# Patient Record
Sex: MALE | Race: WHITE | NOT HISPANIC OR LATINO | Employment: STUDENT | URBAN - METROPOLITAN AREA
[De-identification: names, ages, dates, MRNs, and addresses within clinical notes are randomized per-mention and may not be internally consistent; named-entity substitution may affect disease eponyms.]

---
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
## 2023-05-24 ENCOUNTER — OFFICE VISIT (OUTPATIENT)
Dept: OBGYN CLINIC | Facility: CLINIC | Age: 18
End: 2023-05-24

## 2023-05-24 VITALS
SYSTOLIC BLOOD PRESSURE: 120 MMHG | WEIGHT: 220 LBS | DIASTOLIC BLOOD PRESSURE: 60 MMHG | HEIGHT: 71 IN | BODY MASS INDEX: 30.8 KG/M2

## 2023-05-24 DIAGNOSIS — M79.645 FINGER PAIN, LEFT: ICD-10-CM

## 2023-05-24 NOTE — PROGRESS NOTES
HPI:  Pt is a 20 yo male with a left ring finger FDS muscle strain  He notes improvement with conservative care  He has started some light climbing activities of late without significant discomfort  He notes some bilateral middle finger discomfort at times  PE:  LUE:  Able to make a full fist complex, no edema, full finger extension, normal IP flexion, SILT, no edema  BUE:  Palpable flexor tendon nodules at middle finger palmar digital crease level, some tenderness, no locking    A/P:  Pt is a 20 yo male with a left ring finger FDS muscle strain   -may continue to increase activity as tolerated  -Monitor his bilateral middle trigger finger symptoms as they are mild presently  -May f/u PRN

## 2023-07-27 NOTE — PROGRESS NOTES
PT Evaluation     Today's date: 2023  Patient name: Mila Benitez  : 2005  MRN: [id number]  Referring provider: Julito Carpio DO  Dx:   Encounter Diagnosis     ICD-10-CM    1. Cervicalgia  M54.2       2. Chronic right shoulder pain  M25.511     G89.29                      Assessment  Assessment details: Mila Benitez is a pleasant 19 y.o. presenting to physical therapy with MD referral for cervical and right shoulder pain. Pt's shoulder symptoms are presenting as more radicular symptoms, rather than separate shoulder issue at this time. Pt's shoulder symptoms were reduced with manual cervical traction as well as seated cervical extension. Problem list: Poor posture, decreased upper extremity strength, limited cervical and thoracic joint mobility, increased muscle tone and reduced upper extremity strength. Treatment to include: Manual therapy techniques, cervical and thoracic ROM, shoulder/periscapular muscle strengthening, mechanical traction, instruction in a comprehensive HEP, modalities as needed. This pt would benefit from skilled PT services to address their impairments and functional limitations to maximize functional outcome. Barriers to therapy: Chronicity of symptoms  Understanding of Dx/Px/POC: good   Prognosis: good    Goals  ST. Pt will improve shoulder flexion strength to at least 4+/5 in 3 weeks. 2. Pt will improve shoulder abduction strength to at least 4+/5 in 3 weeks. LT. Pt will be able to kayak and white water raft with minimal to no pain in 6 weeks. 2. Pt will be independent in a comprehensive HEP in 6 weeks. 3. Pt will be able to sit to perform schoolwork with minimal to no pain in 6 weeks.     Plan  Patient would benefit from: skilled physical therapy  Frequency: 2x week  Duration in weeks: 6  Plan of Care beginning date: 2023  Plan of Care expiration date: 2023  Treatment plan discussed with: patient        Subjective Evaluation    History of Present Illness  Mechanism of injury: Pt reports in 2022 when he started at [facility]. Pt states he developed neck discomfort which he attributed to bad beds. Pt states his symptoms are located in right UT/LS region. Pt typically sleeps on his sides; however, has been avoiding his right side due to pain. Pt states about 1-2 months afterwards, he began to experience right shoulder discomfort with no known reason which is located between his spine and scapula. Pt states with kayaking and white water rafting, he experiences pain; however, he does not have issues with climbing. Pt denies any numbness/tingling or weakness. Pt denies any dizziness/lightheadedness. Pt does report frequent headaches which is typically resolved with ibuprofen. Pt denies any changes with coughing,laughing, sneezing. Premorbid status:  - ADLs: Independent with no difficulty  - Work: Full time, Full duty- [employer] white water rafting in the summers  - Recreation: kayaking, white water rafting, mountain climbing    Current status:  - ADLs/Functional activities:     - Rotating neck while driving with no pain   - Looking up to wash hair with no pain   - Looking down to read a book with mild pain   - Reaching into overhead cabinets with Pain Levels: no pain   - Lifting kayak up onto right shoulder with moderate pain in right shoulder/periscapular area   - Sleeping with 0 nightly sleep disturbances due to pain   - Sitting > 60 minutes with increased pain in shoulder and neck  - Work: Part time, Full duty-[employer] in the summers  - Recreation: kayaking, white water rafting, mountain climbing  Patient Goals  Patient goals for therapy: decreased pain and return to sport/leisure activities  Patient goal: Be prepared for return to school .   Pain  Current pain ratin  At worst pain ratin  Location: medial border of right scapula and right UT/LS  Quality: tight and sharp  Alleviating factors: gentle motions. Aggravating factors: lifting (prolonged sitting,)  Progression: worsening    Hand dominance: right    Treatments  Treatments tried: self stretches/improving posture.   Current treatment: physical therapy        Objective     Postural Observations  Seated posture: fair  Standing posture: fair    Additional Postural Observation Details  Moderate forward rounded shoulders and forward ead posture    Palpation     Additional Palpation Details  Increased TTP over R UT/LS; however, both demonstrate increased tension equally bilaterally    Neurological Testing     Sensation   Cervical/Thoracic   Left   Intact: light touch    Right   Intact: light touch    Shoulder   Left Shoulder   Intact: light touch    Right Shoulder   Intact: light touch    Reflexes   Left   Biceps (C5/C6): normal (2+)  Brachioradialis (C6): normal (2+)  Triceps (C7): normal (2+)    Right   Biceps (C5/C6): normal (2+)  Brachioradialis (C6): normal (2+)  Triceps (C7): normal (2+)    Active Range of Motion   Cervical/Thoracic Spine       Cervical    Flexion: 65 (stretch sensation in cervical and shoulder) degrees  with pain  Extension: 60 degrees      Left lateral flexion: 39 (tension in right cervical and periscapular region) degrees      Right lateral flexion: 38 degrees      Left rotation: 80 (tension in right cervical and periscapular region) degrees  Right rotation: 89 degrees         Thoracic    Flexion:  Restriction level: minimal  Extension:  Restriction level: minimal  Left rotation:  Restriction level: minimal  Right rotation:  Restriction level: minimal  Left Shoulder   Normal active range of motion    Right Shoulder   Normal active range of motion    Joint Play     Hypomobile: C2, C3, C4, C5, C6, C7, T1, T2, T3, T4, T5, T6, T7, T8 and T9     Pain: C2, C3, T2, T3 and T4   Mechanical Assessment    Cervical    Seated Protrusion: repeated movements   Pain location: no change  Seated retraction: repeated movements   Pain location: no change  Pain intensity: worse  Seated Flexion:  repeated movements  Pain location: peripheralized  Pain intensity: worse  Pain level: increased  Seated Extension: repeated movements  Pain location: centralized  Pain intensity: better  Pain level: decreased    Thoracic      Lumbar      Strength/Myotome Testing   Cervical Spine     Left   Interossei strength (t1): 5    Right   Interossei strength (t1): 5    Left Shoulder     Planes of Motion   Flexion: 4   Abduction: 4   External rotation at 0°: 4+   Internal rotation at 0°: 5     Right Shoulder     Planes of Motion   Flexion: 4+   Abduction: 5   External rotation at 0°: 5   Internal rotation at 0°: 5     Left Elbow   Flexion: 5  Extension: 4+    Right Elbow   Flexion: 5  Extension: 4+    Left Wrist/Hand   Wrist extension: 5  Wrist flexion: 5  Thumb extension: 4    Right Wrist/Hand   Wrist extension: 5  Wrist flexion: 5  Thumb extension: 5    Tests   Cervical   Positive repeated flexion. Negative vertical compression, alar ligament test, transverse ligament test and VBI. Left   Negative Spurling's Test A. Right   Negative Spurling's Test A. Left Shoulder   Negative Neer's. Right Shoulder   Positive Hawkin's. Negative drop arm, full can and Neer's. Lumbar   Negative vertical compression. Additional Tests Details  Reduced radicular symptoms with manual cervical traction in both neutral and flexion.              Precautions: none  HEP: Access Code: [email or web address] (updated: 7-28-23)    POC Expires: 9-8-23  Next RE Due: 8-25-23    Manuals 7-28 (IE)            Cervical joint mobilizations NV            Central PA thoracic mobilizatoins NV                                      Neuro Re-Ed             TB:             - rows 20 x 5" GTB NV            - pulldowns 20 x 5" GTB NV            - no money 20 x 5" GTB NV                         Prone (towel under forehead):             - I's             - T's             - Y's             - W's Ther Ex             UBE (ant/post) to improve UE strength/endurance 4'/4' 120 rpm NV            Thoracic extension over towel roll on UBE chair 20 x 5" NV                         Supine on half foam:             - pec stretch 60" x 2 NV            - horizontal abd/add 60" x 2 NV            - shoulder flex/ext 60" x 2 NV                                      Ther Activity                                       Gait Training                                       Modalities             Cervical traction, static, 30% speed, 6 steps 10 mins, 10# NV                           * On initial evaluation, educated pt on anatomy, pathology, and exercise rationale. Provided pt with basic HEP and ensured proper exercise performance. Educated pt to call with any questions or concerns.

## 2023-07-28 ENCOUNTER — EVALUATION (OUTPATIENT)
Dept: PHYSICAL THERAPY | Facility: CLINIC | Age: 18
End: 2023-07-28
Payer: COMMERCIAL

## 2023-07-28 DIAGNOSIS — G89.29 CHRONIC RIGHT SHOULDER PAIN: ICD-10-CM

## 2023-07-28 DIAGNOSIS — M54.2 CERVICALGIA: Primary | ICD-10-CM

## 2023-07-28 DIAGNOSIS — M25.511 CHRONIC RIGHT SHOULDER PAIN: ICD-10-CM

## 2023-07-28 PROCEDURE — 97110 THERAPEUTIC EXERCISES: CPT | Performed by: PHYSICAL THERAPIST

## 2023-07-28 PROCEDURE — 97162 PT EVAL MOD COMPLEX 30 MIN: CPT | Performed by: PHYSICAL THERAPIST

## 2023-07-28 NOTE — LETTER
2023    [address] [facility] [address]    Patient: Andrea Stokes   YOB: 2005   Date of Visit: 2023     Encounter Diagnosis     ICD-10-CM    1. Cervicalgia  M54.2       2. Chronic right shoulder pain  M25.511     G89.29           Dear Dr. Vipul Hairston:    Thank you for your recent referral of Andrea Stokes. Please review the attached evaluation summary from Cameron's recent visit. Please verify that you agree with the plan of care by signing the attached order. If you have any questions or concerns, please do not hesitate to call. I sincerely appreciate the opportunity to share in the care of one of your patients and hope to have another opportunity to work with you in the near future. Sincerely,    Jonatan Murillo, PT      Referring Provider:      I certify that I have read the below Plan of Care and certify the need for these services furnished under this plan of treatment while under my care. Ilene Newman DO   [address] [place] [address]  Via Fax: [phone]          PT Evaluation     Today's date: 2023  Patient name: Andrea Stokes  : 2005  MRN: [id number]  Referring provider: Ilene Newman DO  Dx:   Encounter Diagnosis     ICD-10-CM    1. Cervicalgia  M54.2       2. Chronic right shoulder pain  M25.511     G89.29                      Assessment  Assessment details: Andrea Stokes is a pleasant 19 y.o. presenting to physical therapy with MD referral for cervical and right shoulder pain. Pt's shoulder symptoms are presenting as more radicular symptoms, rather than separate shoulder issue at this time. Pt's shoulder symptoms were reduced with manual cervical traction as well as seated cervical extension.      Problem list: Poor posture, decreased upper extremity strength, limited cervical and thoracic joint mobility, increased muscle tone and reduced upper extremity strength. Treatment to include: Manual therapy techniques, cervical and thoracic ROM, shoulder/periscapular muscle strengthening, mechanical traction, instruction in a comprehensive HEP, modalities as needed. This pt would benefit from skilled PT services to address their impairments and functional limitations to maximize functional outcome. Barriers to therapy: Chronicity of symptoms  Understanding of Dx/Px/POC: good   Prognosis: good    Goals  ST. Pt will improve shoulder flexion strength to at least 4+/5 in 3 weeks. 2. Pt will improve shoulder abduction strength to at least 4+/5 in 3 weeks. LT. Pt will be able to kayak and white water raft with minimal to no pain in 6 weeks. 2. Pt will be independent in a comprehensive HEP in 6 weeks. 3. Pt will be able to sit to perform schoolwork with minimal to no pain in 6 weeks. Plan  Patient would benefit from: skilled physical therapy  Frequency: 2x week  Duration in weeks: 6  Plan of Care beginning date: 2023  Plan of Care expiration date: 2023  Treatment plan discussed with: patient        Subjective Evaluation    History of Present Illness  Mechanism of injury: Pt reports in 2022 when he started at [facility]. Pt states he developed neck discomfort which he attributed to bad beds. Pt states his symptoms are located in right UT/LS region. Pt typically sleeps on his sides; however, has been avoiding his right side due to pain. Pt states about 1-2 months afterwards, he began to experience right shoulder discomfort with no known reason which is located between his spine and scapula. Pt states with kayaking and white water rafting, he experiences pain; however, he does not have issues with climbing. Pt denies any numbness/tingling or weakness. Pt denies any dizziness/lightheadedness. Pt does report frequent headaches which is typically resolved with ibuprofen.  Pt denies any changes with coughing,laughing, sneezing. Premorbid status:  - ADLs: Independent with no difficulty  - Work: Full time, Full duty- [employer] in the summers  - Recreation: kayaking, white water rafting, mountain climbing    Current status:  - ADLs/Functional activities:     - Rotating neck while driving with no pain   - Looking up to wash hair with no pain   - Looking down to read a book with mild pain   - Reaching into overhead cabinets with Pain Levels: no pain   - Lifting kayak up onto right shoulder with moderate pain in right shoulder/periscapular area   - Sleeping with 0 nightly sleep disturbances due to pain   - Sitting > 60 minutes with increased pain in shoulder and neck  - Work: Part time, Full duty-[employer] in the summers  - Recreation: kayaking, white water rafting, mountain climbing  Patient Goals  Patient goals for therapy: decreased pain and return to sport/leisure activities  Patient goal: Be prepared for return to school . Pain  Current pain ratin  At worst pain ratin  Location: medial border of right scapula and right UT/LS  Quality: tight and sharp  Alleviating factors: gentle motions. Aggravating factors: lifting (prolonged sitting,)  Progression: worsening    Hand dominance: right    Treatments  Treatments tried: self stretches/improving posture.   Current treatment: physical therapy        Objective     Postural Observations  Seated posture: fair  Standing posture: fair    Additional Postural Observation Details  Moderate forward rounded shoulders and forward ead posture    Palpation     Additional Palpation Details  Increased TTP over R UT/LS; however, both demonstrate increased tension equally bilaterally    Neurological Testing     Sensation   Cervical/Thoracic   Left   Intact: light touch    Right   Intact: light touch    Shoulder   Left Shoulder   Intact: light touch    Right Shoulder   Intact: light touch    Reflexes   Left   Biceps (C5/C6): normal (2+)  Brachioradialis (C6): normal (2+)  Triceps (C7): normal (2+)    Right   Biceps (C5/C6): normal (2+)  Brachioradialis (C6): normal (2+)  Triceps (C7): normal (2+)    Active Range of Motion   Cervical/Thoracic Spine       Cervical    Flexion: 65 (stretch sensation in cervical and shoulder) degrees  with pain  Extension: 60 degrees      Left lateral flexion: 39 (tension in right cervical and periscapular region) degrees      Right lateral flexion: 38 degrees      Left rotation: 80 (tension in right cervical and periscapular region) degrees  Right rotation: 89 degrees         Thoracic    Flexion:  Restriction level: minimal  Extension:  Restriction level: minimal  Left rotation:  Restriction level: minimal  Right rotation:  Restriction level: minimal  Left Shoulder   Normal active range of motion    Right Shoulder   Normal active range of motion    Joint Play     Hypomobile: C2, C3, C4, C5, C6, C7, T1, T2, T3, T4, T5, T6, T7, T8 and T9     Pain: C2, C3, T2, T3 and T4   Mechanical Assessment    Cervical    Seated Protrusion: repeated movements   Pain location: no change  Seated retraction: repeated movements   Pain location: no change  Pain intensity: worse  Seated Flexion:  repeated movements  Pain location: peripheralized  Pain intensity: worse  Pain level: increased  Seated Extension: repeated movements  Pain location: centralized  Pain intensity: better  Pain level: decreased    Thoracic      Lumbar      Strength/Myotome Testing   Cervical Spine     Left   Interossei strength (t1): 5    Right   Interossei strength (t1): 5    Left Shoulder     Planes of Motion   Flexion: 4   Abduction: 4   External rotation at 0°: 4+   Internal rotation at 0°: 5     Right Shoulder     Planes of Motion   Flexion: 4+   Abduction: 5   External rotation at 0°: 5   Internal rotation at 0°: 5     Left Elbow   Flexion: 5  Extension: 4+    Right Elbow   Flexion: 5  Extension: 4+    Left Wrist/Hand   Wrist extension: 5  Wrist flexion: 5  Thumb extension: 4    Right Wrist/Hand   Wrist extension: 5  Wrist flexion: 5  Thumb extension: 5    Tests   Cervical   Positive repeated flexion. Negative vertical compression, alar ligament test, transverse ligament test and VBI. Left   Negative Spurling's Test A. Right   Negative Spurling's Test A. Left Shoulder   Negative Neer's. Right Shoulder   Positive Hawkin's. Negative drop arm, full can and Neer's. Lumbar   Negative vertical compression. Additional Tests Details  Reduced radicular symptoms with manual cervical traction in both neutral and flexion. Precautions: none  HEP: Access Code: [email or web address] (updated: 7-28-23)    POC Expires: 9-8-23  Next RE Due: 8-25-23    Manuals 7-28 (IE)            Cervical joint mobilizations NV            Central PA thoracic mobilizatoins NV                                      Neuro Re-Ed             TB:             - rows 20 x 5" GTB NV            - pulldowns 20 x 5" GTB NV            - no money 20 x 5" GTB NV                         Prone (towel under forehead):             - I's             - T's             - Y's             - W's                          Ther Ex             UBE (ant/post) to improve UE strength/endurance 4'/4' 120 rpm NV            Thoracic extension over towel roll on UBE chair 20 x 5" NV                         Supine on half foam:             - pec stretch 60" x 2 NV            - horizontal abd/add 60" x 2 NV            - shoulder flex/ext 60" x 2 NV                                      Ther Activity                                       Gait Training                                       Modalities             Cervical traction, static, 30% speed, 6 steps 10 mins, 10# NV                          * On initial evaluation, educated pt on anatomy, pathology, and exercise rationale. Provided pt with basic HEP and ensured proper exercise performance. Educated pt to call with any questions or concerns.

## 2023-07-31 ENCOUNTER — OFFICE VISIT (OUTPATIENT)
Dept: PHYSICAL THERAPY | Facility: CLINIC | Age: 18
End: 2023-07-31
Payer: COMMERCIAL

## 2023-07-31 DIAGNOSIS — M25.511 CHRONIC RIGHT SHOULDER PAIN: Primary | ICD-10-CM

## 2023-07-31 DIAGNOSIS — G89.29 CHRONIC RIGHT SHOULDER PAIN: Primary | ICD-10-CM

## 2023-07-31 DIAGNOSIS — M54.2 CERVICALGIA: ICD-10-CM

## 2023-07-31 PROCEDURE — 97112 NEUROMUSCULAR REEDUCATION: CPT | Performed by: PHYSICAL THERAPIST

## 2023-07-31 PROCEDURE — 97140 MANUAL THERAPY 1/> REGIONS: CPT | Performed by: PHYSICAL THERAPIST

## 2023-07-31 PROCEDURE — 97012 MECHANICAL TRACTION THERAPY: CPT | Performed by: PHYSICAL THERAPIST

## 2023-07-31 PROCEDURE — 97110 THERAPEUTIC EXERCISES: CPT | Performed by: PHYSICAL THERAPIST

## 2023-07-31 NOTE — PROGRESS NOTES
Daily Note     Today's date: 2023  Patient name: Irma Dumont  : 2005  MRN: [id number]  Referring provider: Jeffery Isbell DO  Dx:   Encounter Diagnosis     ICD-10-CM    1. Chronic right shoulder pain  M25.511     G89.29       2. Cervicalgia  M54.2                      Subjective: Pt reports he has been compliant with his HEP; however, the scapular retraction caused pain and clicking in his right shoulder. Pt also reports no change in symptoms with cervical extension. Objective: See treatment diary below      Assessment: Initiated exercises this date to address impairments noted during initial evaluation. Pt reported some increase in shoulder discomfort and clicking with TB resistance exercises. PT palpated location of clicking and pain during TB exercises and it is located around T3/T4 at costovertebral junction. Tolerated treatment well. Pt reported feeling more loose following traction. Patient demonstrated fatigue post treatment, exhibited good technique with therapeutic exercises and would benefit from continued PT      Plan: Progress treatment as tolerated.        Precautions: none  HEP: Access Code: [email or web address] (updated: 23)    POC Expires: 23  Next RE Due: 23    Manuals  (IE)            Cervical joint mobilizations NV JG, grade IV, closing mobs to R C2/C3- C6/C7           Central PA thoracic mobilizatoins NV Grade IV, T1/T2- T8/T9                                     Neuro Re-Ed             TB:             - rows 20 x 5" GTB NV 10 x 5" GTB           - pulldowns 20 x 5" GTB NV 10 x 5" GTB           - no money 20 x 5" GTB NV 20 x 5" GTB                        Prone (towel under forehead):             - I's             - T's             - Y's             - W's                          Ther Ex             UBE (ant/post) to improve UE strength/endurance 4'/4' 120 rpm NV 4'/4' 120 rpm           Thoracic extension over towel roll on UBE chair 20 x 5" NV 20 x 5" Supine on half foam:             - pec stretch 60" x 2 NV 60" x 2           - horizontal abd/add 60" x 2 NV 60" x 2           - shoulder flex/ext 60" x 2 NV 60" x 2           - serratus punches  2 x 10                        Ther Activity                                       Gait Training                                       Modalities             Cervical traction, static, 30% speed, 6 steps 10 mins, 10# NV 10 mins, 12#

## 2023-08-03 ENCOUNTER — OFFICE VISIT (OUTPATIENT)
Dept: PHYSICAL THERAPY | Facility: CLINIC | Age: 18
End: 2023-08-03
Payer: COMMERCIAL

## 2023-08-03 DIAGNOSIS — G89.29 CHRONIC RIGHT SHOULDER PAIN: Primary | ICD-10-CM

## 2023-08-03 DIAGNOSIS — M25.511 CHRONIC RIGHT SHOULDER PAIN: Primary | ICD-10-CM

## 2023-08-03 DIAGNOSIS — M54.2 CERVICALGIA: ICD-10-CM

## 2023-08-03 PROCEDURE — 97110 THERAPEUTIC EXERCISES: CPT | Performed by: PHYSICAL THERAPIST

## 2023-08-03 PROCEDURE — 97112 NEUROMUSCULAR REEDUCATION: CPT | Performed by: PHYSICAL THERAPIST

## 2023-08-03 PROCEDURE — 97012 MECHANICAL TRACTION THERAPY: CPT | Performed by: PHYSICAL THERAPIST

## 2023-08-03 PROCEDURE — 97140 MANUAL THERAPY 1/> REGIONS: CPT | Performed by: PHYSICAL THERAPIST

## 2023-08-03 NOTE — PROGRESS NOTES
Daily Note     Today's date: 8/3/2023  Patient name: Sugar Mendoza  : 2005  MRN: [id number]  Referring provider: Latesha Gordon DO  Dx:   Encounter Diagnosis     ICD-10-CM    1. Chronic right shoulder pain  M25.511     G89.29       2. Cervicalgia  M54.2                      Subjective: Patient offers no new complaints. 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 Objective: See treatment diary below      Assessment: Tolerated treatment well. Demonstrated slight limitation with L cervical SB and rot today. Patient demonstrated fatigue post treatment, exhibited good technique with therapeutic exercises and would benefit from continued PT      Plan: Continue per plan of care. Progress treatment as tolerated.        Precautions: none  HEP: Access Code: [email or web address] (updated: 23)    POC Expires: 23  Next RE Due: 23    Manuals  (IE)  8-3          Cervical joint mobilizations NV JG, grade IV, closing mobs to R C2/C3- C6/C7 KG, grade IV, closing mobs to R C2/C3- C6/C7          Central PA thoracic mobilizatoins NV Grade IV, T1/T2- T8/T9 KG  Grade IV, T1/T2- T8/T9                                    Neuro Re-Ed             TB:             - rows 20 x 5" GTB NV 10 x 5" GTB 20x 5"   GTB2          - pulldowns 20 x 5" GTB NV 10 x 5" GTB 20x 5"   GTB          - no money 20 x 5" GTB NV 20 x 5" GTB 20x 5" GTB                       Prone (towel under forehead):             - I's             - T's             - Y's             - W's                          Ther Ex             UBE (ant/post) to improve UE strength/endurance 4'/4' 120 rpm NV 4'/4' 120 rpm 5'/5'  120 rpm          Thoracic extension over towel roll on UBE chair 20 x 5" NV 20 x 5" 20x 5"                       Supine on half foam:             - pec stretch 60" x 2 NV 60" x 2 60"x2          - horizontal abd/add 60" x 2 NV 60" x 2 60"x2          - shoulder flex/ext 60" x 2 NV 60" x 2 60"x2          - serratus punches  2 x 10 2x10                       Ther Activity                                       Gait Training                                       Modalities             Cervical traction, static, 30% speed, 6 steps 10 mins, 10# NV 10 mins, 12# 10 mins, 12#

## 2023-08-08 ENCOUNTER — OFFICE VISIT (OUTPATIENT)
Dept: PHYSICAL THERAPY | Facility: CLINIC | Age: 18
End: 2023-08-08
Payer: COMMERCIAL

## 2023-08-08 DIAGNOSIS — M25.511 CHRONIC RIGHT SHOULDER PAIN: ICD-10-CM

## 2023-08-08 DIAGNOSIS — M54.2 CERVICALGIA: Primary | ICD-10-CM

## 2023-08-08 DIAGNOSIS — G89.29 CHRONIC RIGHT SHOULDER PAIN: ICD-10-CM

## 2023-08-08 PROCEDURE — 97112 NEUROMUSCULAR REEDUCATION: CPT | Performed by: PHYSICAL THERAPIST

## 2023-08-08 PROCEDURE — 97110 THERAPEUTIC EXERCISES: CPT | Performed by: PHYSICAL THERAPIST

## 2023-08-08 PROCEDURE — 97140 MANUAL THERAPY 1/> REGIONS: CPT | Performed by: PHYSICAL THERAPIST

## 2023-08-08 NOTE — PROGRESS NOTES
Daily Note     Today's date: 2023  Patient name: Yen Glass  : 2005  MRN: [id number]  Referring provider: Kavya Galvez DO  Dx:   Encounter Diagnosis     ICD-10-CM    1. Cervicalgia  M54.2       2. Chronic right shoulder pain  M25.511     G89.29                      Subjective: Pt states he has began to experience some discomfort in his lats near the inferior border of his scapula on the right. Pt also notes continued clicking at costovertebral junction on R.       Objective: See treatment diary below      Assessment: Reduced pain and improvement in cervical ROM noted following manual therapy techniques this date. Pt reported exercise progressions were challenging this date, but did not cause any increase in pain. Tolerated treatment well. Patient demonstrated fatigue post treatment, exhibited good technique with therapeutic exercises and would benefit from continued PT. Plan: Progress treatment as tolerated.        Precautions: none  HEP: Access Code: [email or web address] (updated: 23)    POC Expires: 23  Next RE Due: 23    Manuals  (IE)  8-3 8-8         Cervical joint mobilizations NV JG, grade IV, closing mobs to R C2/C3- C6/C7 KG, grade IV, closing mobs to R C2/C3- C6/C7 JG, grade IV, closing mobs to R C2/C3- C6/C7 and L C4/C5         Central PA thoracic mobilizatoins NV Grade IV, T1/T2- T8/T9 KG  Grade IV, T1/T2- T8/T9 Seated, extension and rotational         CPA to R 4th costovertebral junction    JG, Grade IV                      Neuro Re-Ed             TB:             - rows 20 x 5" GTB NV 10 x 5" GTB 20x 5"   GTB2 20 x 5" GTB         - pulldowns 20 x 5" GTB NV 10 x 5" GTB 20x 5"   GTB 20 x 5" GTB         - no money 20 x 5" GTB NV 20 x 5" GTB 20x 5" GTB 20 x 5" GTB                      Prone (towel under forehead):             - I's    10 x 5" ea         - T's    10 x 5" ea         - Y's    10 x 5" ea         - W's                          Ther Ex             UBE (ant/post) to improve UE strength/endurance 4'/4' 120 rpm NV 4'/4' 120 rpm 5'/5'  120 rpm 5'/5' 90 rpm         Thoracic extension over towel roll on UBE chair 20 x 5" NV 20 x 5" 20x 5" 20 x 5"                      Supine on half foam:             - pec stretch 60" x 2 NV 60" x 2 60"x2 60" x 2         - horizontal abd/add 60" x 2 NV 60" x 2 60"x2 60" x 2         - shoulder flex/ext 60" x 2 NV 60" x 2 60"x2 60" x 2         - serratus punches  2 x 10 2x10 3 x 10, 7#                      Ther Activity                                       Gait Training                                       Modalities             Cervical traction, static, 30% speed, 6 steps 10 mins, 10# NV 10 mins, 12# 10 mins, 12# NV

## 2023-08-10 ENCOUNTER — OFFICE VISIT (OUTPATIENT)
Dept: PHYSICAL THERAPY | Facility: CLINIC | Age: 18
End: 2023-08-10
Payer: COMMERCIAL

## 2023-08-10 DIAGNOSIS — G89.29 CHRONIC RIGHT SHOULDER PAIN: Primary | ICD-10-CM

## 2023-08-10 DIAGNOSIS — M54.2 CERVICALGIA: ICD-10-CM

## 2023-08-10 DIAGNOSIS — M25.511 CHRONIC RIGHT SHOULDER PAIN: Primary | ICD-10-CM

## 2023-08-10 PROCEDURE — 97140 MANUAL THERAPY 1/> REGIONS: CPT | Performed by: PHYSICAL THERAPIST

## 2023-08-10 PROCEDURE — 97012 MECHANICAL TRACTION THERAPY: CPT | Performed by: PHYSICAL THERAPIST

## 2023-08-10 PROCEDURE — 97112 NEUROMUSCULAR REEDUCATION: CPT | Performed by: PHYSICAL THERAPIST

## 2023-08-10 PROCEDURE — 97110 THERAPEUTIC EXERCISES: CPT | Performed by: PHYSICAL THERAPIST

## 2023-08-10 NOTE — PROGRESS NOTES
Daily Note     Today's date: 8/10/2023  Patient name: Veronica Rodriguez  : 2005  MRN: [id number]  Referring provider: Jelly Cee DO  Dx:   Encounter Diagnosis     ICD-10-CM    1. Chronic right shoulder pain  M25.511     G89.29       2. Cervicalgia  M54.2                      Subjective: Patient reports he is noticing less clicking while kayaking. Pt continues to report clicking at his 4th rib which he refers to as his shoulder clicking. Objective: See treatment diary below      Assessment: Progressed exercises this session as charted to enhance functional strength and postural awareness. Tolerated treatment well. Patient demonstrated fatigue post treatment, exhibited good technique with therapeutic exercises and would benefit from continued PT. Plan: Progress treatment as tolerated.        Precautions: none  HEP: Access Code: [email or web address] (updated: 23)    POC Expires: 23  Next RE Due: 23    Manuals  (IE)  8-3 8-8 8-10        Cervical joint mobilizations NV JG, grade IV, closing mobs to R C2/C3- C6/C7 KG, grade IV, closing mobs to R C2/C3- C6/C7 JG, grade IV, closing mobs to R C2/C3- C6/C7 and L C4/C5 JG, Grade IV closing mobilizations on R C7/T1        Central PA thoracic mobilizatoins NV Grade IV, T1/T2- T8/T9 KG  Grade IV, T1/T2- T8/T9 Seated, extension and rotational Seated, extension and rotational        CPA to R 4th costovertebral junction    JG, Grade IV JG, Grade IV                     Neuro Re-Ed             TB:             - rows 20 x 5" GTB NV 10 x 5" GTB 20x 5"   GTB2 20 x 5" GTB 30 x 5" GTB        - pulldowns 20 x 5" GTB NV 10 x 5" GTB 20x 5"   GTB 20 x 5" GTB 30 x 5" GTB        - no money 20 x 5" GTB NV 20 x 5" GTB 20x 5" GTB 20 x 5" GTB 20 x 5" GTB                     Prone (towel under forehead):             - I's    10 x 5" ea 10 x 5" ea        - T's    10 x 5" ea 10 x 5" ea        - Y's    10 x 5" ea 10 x 5" ea        - W's                          Ther Ex UBE (ant/post) to improve UE strength/endurance 4'/4' 120 rpm NV 4'/4' 120 rpm 5'/5'  120 rpm 5'/5' 90 rpm 5'/5' 90 rpm        Thoracic extension over towel roll on UBE chair 20 x 5" NV 20 x 5" 20x 5" 20 x 5" 20 x 5"                     Supine on half foam:             - pec stretch 60" x 2 NV 60" x 2 60"x2 60" x 2 60" x 2        - horizontal abd/add 60" x 2 NV 60" x 2 60"x2 60" x 2 60" x 2        - shoulder flex/ext 60" x 2 NV 60" x 2 60"x2 60" x 2    60" x 2        - serratus punches  2 x 10 2x10 3 x 10, 7# 3 x 10, 8#                     Ther Activity                                       Gait Training                                       Modalities             Cervical traction, static, 30% speed, 6 steps 10 mins, 10# NV 10 mins, 12# 10 mins, 12# NV 10 mins, 12#

## 2023-08-15 ENCOUNTER — OFFICE VISIT (OUTPATIENT)
Dept: PHYSICAL THERAPY | Facility: CLINIC | Age: 18
End: 2023-08-15
Payer: COMMERCIAL

## 2023-08-15 DIAGNOSIS — M25.511 CHRONIC RIGHT SHOULDER PAIN: ICD-10-CM

## 2023-08-15 DIAGNOSIS — G89.29 CHRONIC RIGHT SHOULDER PAIN: ICD-10-CM

## 2023-08-15 DIAGNOSIS — M54.2 CERVICALGIA: Primary | ICD-10-CM

## 2023-08-15 PROCEDURE — 97112 NEUROMUSCULAR REEDUCATION: CPT | Performed by: PHYSICAL THERAPIST

## 2023-08-15 PROCEDURE — 97140 MANUAL THERAPY 1/> REGIONS: CPT | Performed by: PHYSICAL THERAPIST

## 2023-08-15 NOTE — PROGRESS NOTES
Daily Note     Today's date: 8/15/2023  Patient name: Sugar Mendoza  : 2005  MRN: [id number]  Referring provider: Latesha Gordon DO  Dx:   Encounter Diagnosis     ICD-10-CM    1. Cervicalgia  M54.2       2. Chronic right shoulder pain  M25.511     G89.29                      Subjective: Patient reports he was able to kayak over the weekend with no pain for 2 days; however, on the 3rd day he experienced some pain. Objective: See treatment diary below      Assessment: Progressed exercises this session to more advanced strengthening/neuromuscular control exercises. Provided pt green TB and instruction to perform former TB exercises every other day. Tolerated treatment well. Patient demonstrated fatigue post treatment, exhibited good technique with therapeutic exercises and would benefit from continued PT      Plan: Progress treatment as tolerated.        Precautions: none  HEP: Access Code: [email or web address] (updated: 23)    POC Expires: 23  Next RE Due: 23    Manuals  (IE)  8-3 8-8 8-10 8-15       Cervical joint mobilizations NV JG, grade IV, closing mobs to R C2/C3- C6/C7 KG, grade IV, closing mobs to R C2/C3- C6/C7 JG, grade IV, closing mobs to R C2/C3- C6/C7 and L C4/C5 JG, Grade IV closing mobilizations on R C7/T1 JG, Grade IV closing mobilizations on R C7/T1       Central PA thoracic mobilizatoins NV Grade IV, T1/T2- T8/T9 KG  Grade IV, T1/T2- T8/T9 Seated, extension and rotational Seated, extension and rotational Seated, extension and rotational       CPA to R 4th costovertebral junction    JG, Grade IV JG, Grade IV JG, Grade IV                    Neuro Re-Ed             TB:             - rows 20 x 5" GTB NV 10 x 5" GTB 20x 5"   GTB2 20 x 5" GTB 30 x 5" GTB        - pulldowns 20 x 5" GTB NV 10 x 5" GTB 20x 5"   GTB 20 x 5" GTB 30 x 5" GTB        - no money 20 x 5" GTB NV 20 x 5" GTB 20x 5" GTB 20 x 5" GTB 20 x 5" GTB        - serratus punches      2 x 10 Black TB       - D1 extension      2  x 10 ea Black TB       - D2 flexion      2 x 10 ea Blue TB       - Middle Trap      3 x 10  Blue TB                                 Prone (towel under forehead):             - I's    10 x 5" ea 10 x 5" ea 10 x 5" ea       - T's    10 x 5" ea 10 x 5" ea 10 x 5" ea       - Y's    10 x 5" ea 10 x 5" ea 10 x 5" ea       - W's                          Ther Ex             UBE (ant/post) to improve UE strength/endurance 4'/4' 120 rpm NV 4'/4' 120 rpm 5'/5'  120 rpm 5'/5' 90 rpm 5'/5' 90 rpm 5'/5' 90 rpm       Thoracic extension over towel roll on UBE chair 20 x 5" NV 20 x 5" 20x 5" 20 x 5" 20 x 5" 20  X 5"                    Supine on half foam:             - pec stretch 60" x 2 NV 60" x 2 60"x2 60" x 2 60" x 2 60" x 2       - horizontal abd/add 60" x 2 NV 60" x 2 60"x2 60" x 2 60" x 2 60" x 2       - shoulder flex/ext 60" x 2 NV 60" x 2 60"x2 60" x 2    60" x 2 60" x 2       - serratus punches  2 x 10 2x10 3 x 10, 7# 3 x 10, 8#                     Ther Activity                                       Gait Training                                       Modalities             Cervical traction, static, 30% speed, 6 steps 10 mins, 10# NV 10 mins, 12# 10 mins, 12# NV 10 mins, 12#                          * 38 mins 1:1 time this date.

## 2023-08-17 ENCOUNTER — OFFICE VISIT (OUTPATIENT)
Dept: PHYSICAL THERAPY | Facility: CLINIC | Age: 18
End: 2023-08-17
Payer: COMMERCIAL

## 2023-08-17 DIAGNOSIS — G89.29 CHRONIC RIGHT SHOULDER PAIN: Primary | ICD-10-CM

## 2023-08-17 DIAGNOSIS — M54.2 CERVICALGIA: ICD-10-CM

## 2023-08-17 DIAGNOSIS — M25.511 CHRONIC RIGHT SHOULDER PAIN: Primary | ICD-10-CM

## 2023-08-17 PROCEDURE — 97112 NEUROMUSCULAR REEDUCATION: CPT | Performed by: PHYSICAL THERAPIST

## 2023-08-17 PROCEDURE — 97140 MANUAL THERAPY 1/> REGIONS: CPT | Performed by: PHYSICAL THERAPIST

## 2023-08-17 NOTE — PROGRESS NOTES
Daily Note     Today's date: 2023  Patient name: Becki Bullard  : 2005  MRN: [id number]  Referring provider: Shawnee Ferrara DO  Dx:   Encounter Diagnosis     ICD-10-CM    1. Chronic right shoulder pain  M25.511     G89.29       2. Cervicalgia  M54.2                      Subjective: Patient reports no adverse reactions following previous session; however, does state he is very sore today due to kayaking more than usual since last visit. Objective: See treatment diary below      Assessment: Minimal exercise progressions this date secondary to soreness at onset of session. Pt continues to require minimal verbal cues to perform exercises this date with minimal increase in pain/discomfort. Tolerated treatment well. Patient demonstrated fatigue post treatment, exhibited good technique with therapeutic exercises and would benefit from continued PT      Plan: Progress treatment as tolerated.        Precautions: none  HEP: Access Code: [email or web address] (updated: 23)    POC Expires: 23  Next RE Due: 23    Manuals  (IE)  8-3 8-8 8-10 8-15 8-17      Cervical joint mobilizations NV JG, grade IV, closing mobs to R C2/C3- C6/C7 KG, grade IV, closing mobs to R C2/C3- C6/C7 JG, grade IV, closing mobs to R C2/C3- C6/C7 and L C4/C5 JG, Grade IV closing mobilizations on R C7/T1 JG, Grade IV closing mobilizations on R C7/T1 JG, Grade IV closing mobilizations on R C7/T1      Central PA thoracic mobilizatoins NV Grade IV, T1/T2- T8/T9 KG  Grade IV, T1/T2- T8/T9 Seated, extension and rotational Seated, extension and rotational Seated, extension and rotational Seated, extension and rotational      CPA to R 4th costovertebral junction    JG, Grade IV JG, Grade IV JG, Grade IV JG, Grade IV                   Neuro Re-Ed             TB:             - rows 20 x 5" GTB NV 10 x 5" GTB 20x 5"   GTB2 20 x 5" GTB 30 x 5" GTB        - pulldowns 20 x 5" GTB NV 10 x 5" GTB 20x 5"   GTB 20 x 5" GTB 30 x 5" GTB - no money 20 x 5" GTB NV 20 x 5" GTB 20x 5" GTB 20 x 5" GTB 20 x 5" GTB        - serratus punches      2 x 10 Black TB 2 x 10 Black TB      - D1 extension      2  x 10 ea Black TB 2  x 10 ea Black TB      - D2 flexion      2 x 10 ea Blue TB 2  x 10 ea Blue TB      - Middle Trap      3 x 10  Blue TB 3 x 10  Blue TB                                Prone (towel under forehead):             - I's    10 x 5" ea 10 x 5" ea 10 x 5" ea 10 x 5" ea      - T's    10 x 5" ea 10 x 5" ea 10 x 5" ea 10 x 5" ea      - Y's    10 x 5" ea 10 x 5" ea 10 x 5" ea 10 x 5" ea      - W's                          Ther Ex             UBE (ant/post) to improve UE strength/endurance 4'/4' 120 rpm NV 4'/4' 120 rpm 5'/5'  120 rpm 5'/5' 90 rpm 5'/5' 90 rpm 5'/5' 90 rpm 5'/5' 60 rpm      Thoracic extension over towel roll on UBE chair 20 x 5" NV 20 x 5" 20x 5" 20 x 5" 20 x 5" 20  X 5" 20 x 5"                   Supine on half foam:             - pec stretch 60" x 2 NV 60" x 2 60"x2 60" x 2 60" x 2 60" x 2 60" x 2      - horizontal abd/add 60" x 2 NV 60" x 2 60"x2 60" x 2 60" x 2 60" x 2 60" x 2      - shoulder flex/ext 60" x 2 NV 60" x 2 60"x2 60" x 2    60" x 2 60" x 2 60" x 2      - serratus punches  2 x 10 2x10 3 x 10, 7# 3 x 10, 8#                     Ther Activity                                       Gait Training                                       Modalities             Cervical traction, static, 30% speed, 6 steps 10 mins, 10# NV 10 mins, 12# 10 mins, 12# NV 10 mins, 12#                        * 38 minutes 1:1 time this date.

## 2025-07-10 ENCOUNTER — APPOINTMENT (OUTPATIENT)
Dept: URGENT CARE | Facility: CLINIC | Age: 20
End: 2025-07-10

## 2025-08-22 ENCOUNTER — OFFICE VISIT (OUTPATIENT)
Age: 20
End: 2025-08-22
Payer: COMMERCIAL

## 2025-08-22 VITALS
TEMPERATURE: 98.3 F | HEIGHT: 70 IN | OXYGEN SATURATION: 98 % | WEIGHT: 192.24 LBS | DIASTOLIC BLOOD PRESSURE: 70 MMHG | RESPIRATION RATE: 18 BRPM | SYSTOLIC BLOOD PRESSURE: 118 MMHG | HEART RATE: 80 BPM | BODY MASS INDEX: 27.52 KG/M2

## 2025-08-22 DIAGNOSIS — R68.89 FLU-LIKE SYMPTOMS: Primary | ICD-10-CM

## 2025-08-22 LAB
S PYO AG THROAT QL: NEGATIVE
SARS-COV-2 AG UPPER RESP QL IA: NEGATIVE
VALID CONTROL: NORMAL

## 2025-08-22 PROCEDURE — 87880 STREP A ASSAY W/OPTIC: CPT | Performed by: EMERGENCY MEDICINE

## 2025-08-22 PROCEDURE — G0382 LEV 3 HOSP TYPE B ED VISIT: HCPCS | Performed by: EMERGENCY MEDICINE

## 2025-08-22 PROCEDURE — 87811 SARS-COV-2 COVID19 W/OPTIC: CPT | Performed by: EMERGENCY MEDICINE

## 2025-08-22 RX ORDER — DEXTROAMPHETAMINE SACCHARATE, AMPHETAMINE ASPARTATE MONOHYDRATE, DEXTROAMPHETAMINE SULFATE AND AMPHETAMINE SULFATE 5; 5; 5; 5 MG/1; MG/1; MG/1; MG/1
CAPSULE, EXTENDED RELEASE ORAL
COMMUNITY
Start: 2025-08-11

## 2025-08-22 RX ORDER — PREDNISONE 10 MG/1
TABLET ORAL
Qty: 21 TABLET | Refills: 0 | Status: SHIPPED | OUTPATIENT
Start: 2025-08-22